# Patient Record
Sex: FEMALE
[De-identification: names, ages, dates, MRNs, and addresses within clinical notes are randomized per-mention and may not be internally consistent; named-entity substitution may affect disease eponyms.]

---

## 2021-01-30 ENCOUNTER — HOSPITAL ENCOUNTER (OUTPATIENT)
Dept: HOSPITAL 76 - EMS | Age: 41
End: 2021-01-30
Attending: SURGERY
Payer: SELF-PAY

## 2021-01-30 DIAGNOSIS — R00.0: Primary | ICD-10-CM

## 2021-03-22 ENCOUNTER — HOSPITAL ENCOUNTER (OUTPATIENT)
Dept: HOSPITAL 76 - EMS | Age: 41
Discharge: LEFT BEFORE DISCHARGE | End: 2021-03-22
Payer: SELF-PAY

## 2021-03-22 ENCOUNTER — HOSPITAL ENCOUNTER (EMERGENCY)
Dept: HOSPITAL 76 - ED | Age: 41
LOS: 1 days | Discharge: HOME | End: 2021-03-23
Payer: SELF-PAY

## 2021-03-22 DIAGNOSIS — E27.40: ICD-10-CM

## 2021-03-22 DIAGNOSIS — R00.2: Primary | ICD-10-CM

## 2021-03-22 DIAGNOSIS — R07.9: Primary | ICD-10-CM

## 2021-03-22 DIAGNOSIS — R06.02: ICD-10-CM

## 2021-03-22 LAB
ALBUMIN DIAFP-MCNC: 4.1 G/DL (ref 3.2–5.5)
ALBUMIN/GLOB SERPL: 1.4 {RATIO} (ref 1–2.2)
ALP SERPL-CCNC: 38 IU/L (ref 42–121)
ALT SERPL W P-5'-P-CCNC: 18 IU/L (ref 10–60)
ANION GAP SERPL CALCULATED.4IONS-SCNC: 11 MMOL/L (ref 6–13)
AST SERPL W P-5'-P-CCNC: 18 IU/L (ref 10–42)
BASOPHILS NFR BLD AUTO: 0 10^3/UL (ref 0–0.1)
BASOPHILS NFR BLD AUTO: 0.3 %
BILIRUB BLD-MCNC: 0.2 MG/DL (ref 0.2–1)
BUN SERPL-MCNC: 9 MG/DL (ref 6–20)
CALCIUM UR-MCNC: 9.4 MG/DL (ref 8.5–10.3)
CHLORIDE SERPL-SCNC: 104 MMOL/L (ref 101–111)
CO2 SERPL-SCNC: 24 MMOL/L (ref 21–32)
CREAT SERPLBLD-SCNC: 0.6 MG/DL (ref 0.4–1)
EOSINOPHIL # BLD AUTO: 0 10^3/UL (ref 0–0.7)
EOSINOPHIL NFR BLD AUTO: 0.4 %
ERYTHROCYTE [DISTWIDTH] IN BLOOD BY AUTOMATED COUNT: 11.4 % (ref 12–15)
GFRSERPLBLD MDRD-ARVRAT: 111 ML/MIN/{1.73_M2} (ref 89–?)
GLOBULIN SER-MCNC: 3 G/DL (ref 2.1–4.2)
GLUCOSE SERPL-MCNC: 136 MG/DL (ref 70–100)
HCT VFR BLD AUTO: 41.5 % (ref 37–47)
HGB UR QL STRIP: 14.2 G/DL (ref 12–16)
LIPASE SERPL-CCNC: 29 U/L (ref 22–51)
LYMPHOCYTES # SPEC AUTO: 1.7 10^3/UL (ref 1.5–3.5)
LYMPHOCYTES NFR BLD AUTO: 23.3 %
MAGNESIUM SERPL-MCNC: 2.1 MG/DL (ref 1.7–2.8)
MCH RBC QN AUTO: 31 PG (ref 27–31)
MCHC RBC AUTO-ENTMCNC: 34.2 G/DL (ref 32–36)
MCV RBC AUTO: 90.6 FL (ref 81–99)
MONOCYTES # BLD AUTO: 0.4 10^3/UL (ref 0–1)
MONOCYTES NFR BLD AUTO: 4.9 %
NEUTROPHILS # BLD AUTO: 5 10^3/UL (ref 1.5–6.6)
NEUTROPHILS # SNV AUTO: 7.1 X10^3/UL (ref 4.8–10.8)
NEUTROPHILS NFR BLD AUTO: 70.8 %
NRBC # BLD AUTO: 0 /100WBC
NRBC # BLD AUTO: 0 X10^3/UL
PDW BLD AUTO: 10.1 FL (ref 7.9–10.8)
PLATELET # BLD: 258 10^3/UL (ref 130–450)
POTASSIUM SERPL-SCNC: 3.8 MMOL/L (ref 3.5–5)
PROT SPEC-MCNC: 7.1 G/DL (ref 6.7–8.2)
RBC MAR: 4.58 10^6/UL (ref 4.2–5.4)
SODIUM SERPLBLD-SCNC: 139 MMOL/L (ref 135–145)

## 2021-03-22 PROCEDURE — 80053 COMPREHEN METABOLIC PANEL: CPT

## 2021-03-22 PROCEDURE — 85025 COMPLETE CBC W/AUTO DIFF WBC: CPT

## 2021-03-22 PROCEDURE — 36415 COLL VENOUS BLD VENIPUNCTURE: CPT

## 2021-03-22 PROCEDURE — 99284 EMERGENCY DEPT VISIT MOD MDM: CPT

## 2021-03-22 PROCEDURE — 83690 ASSAY OF LIPASE: CPT

## 2021-03-22 PROCEDURE — 93005 ELECTROCARDIOGRAM TRACING: CPT

## 2021-03-22 PROCEDURE — 84443 ASSAY THYROID STIM HORMONE: CPT

## 2021-03-22 PROCEDURE — 83735 ASSAY OF MAGNESIUM: CPT

## 2021-03-22 NOTE — ED PHYSICIAN DOCUMENTATION
History of Present Illness





- Stated complaint


Stated Complaint: INCREASED HEART RATE





- Chief complaint


Chief Complaint: Cardiac





- History obtained from


History obtained from: Patient





- History of Present Illness


Timing: Today, How many hours ago (few hours ago)


Pain level max: 0


Pain level now: 0


Improved by: nothing


Worsened by: no exacerbating factors





- Additonal information


Additional information: 





c/o sudden onset rapid palpitations [place] 

says another passenger checked her pulse and said it was 150 bpm. she has had 

similar episodes before but no diagnosis (only has had holter monitor once). she

drove home, took dose of steroids as a stress dose due to addrenal 

insufficiency, then came to ED. she has lightheadedness and dizziness but rapid 

palpitations have resolved.





Review of Systems


Constitutional: reports: Reviewed and negative


Cardiac: reports: Palpitations.  denies: Chest pain / pressure, Pedal edema, 

Calf pain


Respiratory: reports: Reviewed and negative


GI: reports: Reviewed and negative


: denies: Now pregnant EGA


Musculoskeletal: reports: Reviewed and negative





PD PAST MEDICAL HISTORY





- Past Medical History


Past Medical History: Yes


Other Past Medical History: addrenal insufficiency





- Past Surgical History


Past Surgical History: No





- Present Medications


Home Medications: 


                                Ambulatory Orders











 Medication  Instructions  Recorded  Confirmed


 


Hydrocortisone [Cortef] PO 03/22/21 


 


Metoprolol Succinate [Toprol Xl] 25 mg PO DAILY 03/22/21 03/22/21














- Allergies


Allergies/Adverse Reactions: 


                                    Allergies











Allergy/AdvReac Type Severity Reaction Status Date / Time


 


No Known Drug Allergies Allergy   Verified 03/22/21 22:41














- Living Situation


Living Arrangement: reports: At home





- Social History


Does the pt smoke?: No





PD ED PE NORMAL





- Vitals


Vital signs reviewed: Yes





- General


General: Alert and oriented X 3, No acute distress, Well developed/nourished





- Neck


Neck: Supple, no meningeal sign





- Cardiac


Cardiac: RRR, No murmur, No gallop, No rub





- Respiratory


Respiratory: No respiratory distress, Clear bilaterally





- Abdomen


Abdomen: Soft, Non tender





- Derm


Derm: Normal color, Warm and dry





- Extremities


Extremities: No edema





Results





- Vitals


Vitals: 


                                     Oxygen











O2 Source                      Room air

















- EKG (time done)


  ** No standard instances


Rate: Rate (enter#) (71)


Rhythm: NSR


Axis: Normal


Intervals: Normal WI


QRS: Normal


Ischemia: Normal ST segments





- Labs


Labs: 


                                Laboratory Tests











  03/22/21 03/22/21 03/22/21





  22:58 22:58 22:58


 


WBC  7.1  


 


RBC  4.58  


 


Hgb  14.2  


 


Hct  41.5  


 


MCV  90.6  


 


MCH  31.0  


 


MCHC  34.2  


 


RDW  11.4 L  


 


Plt Count  258  


 


MPV  10.1  


 


Neut # (Auto)  5.0  


 


Lymph # (Auto)  1.7  


 


Mono # (Auto)  0.4  


 


Eos # (Auto)  0.0  


 


Baso # (Auto)  0.0  


 


Absolute Nucleated RBC  0.00  


 


Nucleated RBC %  0.0  


 


Sodium   139 


 


Potassium   3.8 


 


Chloride   104 


 


Carbon Dioxide   24 


 


Anion Gap   11.0 


 


BUN   9 


 


Creatinine   0.6 


 


Estimated GFR (MDRD)   111 


 


Glucose   136 H 


 


Calcium   9.4 


 


Magnesium   2.1 


 


Total Bilirubin   0.2 


 


AST   18 


 


ALT   18 


 


Alkaline Phosphatase   38 L 


 


Total Protein   7.1 


 


Albumin   4.1 


 


Globulin   3.0 


 


Albumin/Globulin Ratio   1.4 


 


Lipase   29 


 


TSH    1.78














PD MEDICAL DECISION MAKING





- ED course


Complexity details: reviewed results, re-evaluated patient, considered 

differential, d/w patient


ED course: 





no further episodes of palpitations during ED stay and no ectopy or dysrhythmias

on monitor. a few soft bp reads and thus given 10 mg po decadron as stress dose 

steroid (IV was not established) prior to discharge





Departure





- Departure


Disposition: 01 Home, Self Care


Clinical Impression: 


 Palpitations





Condition: Good


Instructions:  ED Palpitations


Comments: 


Follow up with your primary care provider within 1 week


Discharge Date/Time: 03/23/21 00:05

## 2021-03-23 ENCOUNTER — HOSPITAL ENCOUNTER (OUTPATIENT)
Dept: HOSPITAL 76 - EMS | Age: 41
End: 2021-03-23
Payer: SELF-PAY

## 2021-03-23 VITALS — DIASTOLIC BLOOD PRESSURE: 80 MMHG | SYSTOLIC BLOOD PRESSURE: 121 MMHG

## 2021-03-23 DIAGNOSIS — R00.0: Primary | ICD-10-CM

## 2021-03-23 DIAGNOSIS — R42: ICD-10-CM

## 2021-03-23 DIAGNOSIS — R07.9: ICD-10-CM

## 2022-12-16 ENCOUNTER — HOSPITAL ENCOUNTER (EMERGENCY)
Age: 42
Discharge: HOME | End: 2022-12-16
Payer: COMMERCIAL

## 2022-12-16 VITALS
OXYGEN SATURATION: 98 % | TEMPERATURE: 98.24 F | RESPIRATION RATE: 18 BRPM | DIASTOLIC BLOOD PRESSURE: 87 MMHG | HEART RATE: 108 BPM | SYSTOLIC BLOOD PRESSURE: 120 MMHG

## 2022-12-16 VITALS — BODY MASS INDEX: 22.3 KG/M2

## 2022-12-16 VITALS — OXYGEN SATURATION: 99 % | RESPIRATION RATE: 15 BRPM | HEART RATE: 99 BPM

## 2022-12-16 DIAGNOSIS — U07.1: Primary | ICD-10-CM

## 2022-12-16 DIAGNOSIS — R00.2: ICD-10-CM

## 2022-12-16 LAB
COVID-19 CEPHEID 4-PLEX PCR: POSITIVE
FLUAV RNA UPPER RESP QL NAA+PROBE: (no result)
FLUBV RNA UPPER RESP QL NAA+PROBE: (no result)

## 2022-12-16 PROCEDURE — 99284 EMERGENCY DEPT VISIT MOD MDM: CPT

## 2022-12-16 PROCEDURE — 0241U: CPT

## 2022-12-16 PROCEDURE — 71046 X-RAY EXAM CHEST 2 VIEWS: CPT

## 2022-12-16 PROCEDURE — 93010 ELECTROCARDIOGRAM REPORT: CPT

## 2022-12-16 PROCEDURE — 93005 ELECTROCARDIOGRAM TRACING: CPT

## 2022-12-16 PROCEDURE — 99283 EMERGENCY DEPT VISIT LOW MDM: CPT

## 2023-02-01 ENCOUNTER — HOSPITAL ENCOUNTER (EMERGENCY)
Age: 43
Discharge: LEFT BEFORE DISCHARGE | End: 2023-02-01
Payer: COMMERCIAL

## 2023-02-01 VITALS
TEMPERATURE: 98 F | OXYGEN SATURATION: 99 % | RESPIRATION RATE: 21 BRPM | HEART RATE: 95 BPM | SYSTOLIC BLOOD PRESSURE: 119 MMHG | DIASTOLIC BLOOD PRESSURE: 74 MMHG

## 2023-02-01 DIAGNOSIS — Z73.3: Primary | ICD-10-CM

## 2023-02-01 LAB
APPEARANCE UR: (no result)
BILIRUBIN URINE UA: NEGATIVE
COLOR UR: YELLOW
GLUCOSE URINE UA: NEGATIVE G/DL
HGB UR QL: NEGATIVE
KETONES URINE UA: (no result)
LEUKOCYTE ESTERASE URINE UA: NEGATIVE
NITRITE URINE UA: NEGATIVE
PH UR: 8 [PH] (ref 4.5–8)
PROTEIN URINE UA: NEGATIVE
SP GR UR: 1.01 (ref 1–1.03)
UROBILINOGEN UR QL: 0.2 E.U./DL

## 2023-02-01 PROCEDURE — 99281 EMR DPT VST MAYX REQ PHY/QHP: CPT

## 2023-02-01 PROCEDURE — 81001 URINALYSIS AUTO W/SCOPE: CPT

## 2023-02-01 PROCEDURE — 80305 DRUG TEST PRSMV DIR OPT OBS: CPT

## 2023-02-07 ENCOUNTER — HOSPITAL ENCOUNTER (EMERGENCY)
Age: 43
Discharge: HOME | End: 2023-02-07
Payer: COMMERCIAL

## 2023-02-07 VITALS
TEMPERATURE: 97.34 F | RESPIRATION RATE: 20 BRPM | SYSTOLIC BLOOD PRESSURE: 116 MMHG | HEART RATE: 96 BPM | OXYGEN SATURATION: 100 % | DIASTOLIC BLOOD PRESSURE: 68 MMHG

## 2023-02-07 VITALS — BODY MASS INDEX: 21.2 KG/M2

## 2023-02-07 DIAGNOSIS — Z20.822: Primary | ICD-10-CM

## 2023-02-07 PROCEDURE — 99281 EMR DPT VST MAYX REQ PHY/QHP: CPT

## 2023-02-07 PROCEDURE — 87635 SARS-COV-2 COVID-19 AMP PRB: CPT

## 2023-02-07 PROCEDURE — 99282 EMERGENCY DEPT VISIT SF MDM: CPT
